# Patient Record
Sex: MALE | Race: OTHER | URBAN - METROPOLITAN AREA
[De-identification: names, ages, dates, MRNs, and addresses within clinical notes are randomized per-mention and may not be internally consistent; named-entity substitution may affect disease eponyms.]

---

## 2018-06-14 ENCOUNTER — EMERGENCY (EMERGENCY)
Facility: HOSPITAL | Age: 23
LOS: 1 days | Discharge: HOME | End: 2018-06-14
Attending: EMERGENCY MEDICINE | Admitting: EMERGENCY MEDICINE
Payer: SELF-PAY

## 2018-06-14 VITALS — RESPIRATION RATE: 18 BRPM | HEART RATE: 105 BPM | TEMPERATURE: 98 F | OXYGEN SATURATION: 98 %

## 2018-06-14 DIAGNOSIS — R41.82 ALTERED MENTAL STATUS, UNSPECIFIED: ICD-10-CM

## 2018-06-14 DIAGNOSIS — F10.129 ALCOHOL ABUSE WITH INTOXICATION, UNSPECIFIED: ICD-10-CM

## 2018-06-14 PROCEDURE — 99284 EMERGENCY DEPT VISIT MOD MDM: CPT | Mod: 25

## 2018-06-14 PROCEDURE — 99053 MED SERV 10PM-8AM 24 HR FAC: CPT

## 2018-06-14 NOTE — ED PROVIDER NOTE - MEDICAL DECISION MAKING DETAILS
Awake, alert, oriented and conversant patient in no distress BIBA for public intoxication.  No trauma by history or exam.  Alert/oriented x 3, ambulatory with steady gait and balance.  Requesting immediate discharge, no complaints or evidence of toxicity.

## 2018-06-14 NOTE — ED ADULT NURSE NOTE - CHPI ED SYMPTOMS NEG
no vomiting/no pain/no decreased eating/drinking/no nausea/no chills/no tingling/no weakness/no dizziness/no fever/no numbness

## 2018-06-14 NOTE — ED ADULT NURSE NOTE - OBJECTIVE STATEMENT
Pt is a 24y male BIBA for ETOH and altered mental status. Pt has slurred speech. PT is sitting up in bed appears to be in no distress. No obvious signs of trauma or bleeding.  Pt admits to drinking alcohol last night. PT has slurred speech.

## 2018-06-14 NOTE — ED ADULT TRIAGE NOTE - CHIEF COMPLAINT QUOTE
BIBA, for altered mental status. Picked up at [address] Admits to drinking alcohol. No obvious trauma/injury. (+) AOB with slurred speech.

## 2018-06-14 NOTE — ED PROVIDER NOTE - UNABLE TO OBTAIN
HPI limited due to patient arriving intoxicated. Urgent need for Intervention ROS limited due to patient arriving intoxicated.

## 2018-06-14 NOTE — ED PROVIDER NOTE - NEUROLOGICAL, MLM
Alert and oriented, no focal deficits, no motor or sensory deficits. Patient is ambulatory with steady gait.

## 2018-06-14 NOTE — ED PROVIDER NOTE - OBJECTIVE STATEMENT
23 y/o male with PMHx of alcohol abuse presents to ED c/o AMS. Patient was found by ambulance intoxicated in a public place, with no trauma or injury. He arrived awake, alert, ambulatory, and wanted to leave.

## 2021-03-16 NOTE — ED PROVIDER NOTE - CPE EDP PSYCH NORM
Additional Notes: Patient declined [place] f/u today
Render Risk Assessment In Note?: no
Detail Level: Simple
normal...
